# Patient Record
Sex: MALE | Race: WHITE | NOT HISPANIC OR LATINO | URBAN - METROPOLITAN AREA
[De-identification: names, ages, dates, MRNs, and addresses within clinical notes are randomized per-mention and may not be internally consistent; named-entity substitution may affect disease eponyms.]

---

## 2018-12-29 PROBLEM — J06.9 URI WITH COUGH AND CONGESTION: Status: ACTIVE | Noted: 2018-12-29

## 2020-10-09 ENCOUNTER — TELEPHONE (OUTPATIENT)
Dept: ENDOCRINOLOGY | Facility: CLINIC | Age: 50
End: 2020-10-09

## 2020-10-09 DIAGNOSIS — E03.9 PRIMARY HYPOTHYROIDISM: Primary | ICD-10-CM

## 2020-10-09 NOTE — TELEPHONE ENCOUNTER
PATIENT IS CALLING STATING HIS WRIST BAND TELLS HIM DURING HIS SLEEP HIS RESTING HEART RATE IS 38-39 PER MINUTES. THINGS THAT CAN CAUSE LOW HEART RATE IS HYPOTHYROIDISM FROM READING THE INTERNET. HE WOULD LIKE A CALL BACK TO DISCUSS THIS AND SEE IF THERE IS SOMETHING HE NEEDS TO BE DOING OR USING TO HELP RESTING HEART RATE. PHONE NUMBER [phone]. HE IS HAVING SENSORY ISSUES AS WELL WITH TINGLING OF EXTREMITIES AND SOME DIZZY SPELLS. HE DOES HAVE HYPOTHYROID ISSUES.

## 2020-10-09 NOTE — TELEPHONE ENCOUNTER
If the hypothyroidism isn't treated it can cause low heart rate.  It has been almost a year since we saw him.  If he hasn't had TSH done recently, let's have him get one done.

## 2020-12-03 ENCOUNTER — OFFICE VISIT (OUTPATIENT)
Dept: ENDOCRINOLOGY | Facility: CLINIC | Age: 50
End: 2020-12-03

## 2020-12-03 ENCOUNTER — LAB (OUTPATIENT)
Dept: LAB | Facility: HOSPITAL | Age: 50
End: 2020-12-03

## 2020-12-03 VITALS
HEART RATE: 62 BPM | TEMPERATURE: 97.1 F | WEIGHT: 227.6 LBS | DIASTOLIC BLOOD PRESSURE: 76 MMHG | HEIGHT: 76 IN | OXYGEN SATURATION: 99 % | BODY MASS INDEX: 27.72 KG/M2 | SYSTOLIC BLOOD PRESSURE: 118 MMHG

## 2020-12-03 DIAGNOSIS — R00.1 BRADYCARDIA: ICD-10-CM

## 2020-12-03 DIAGNOSIS — E55.9 VITAMIN D DEFICIENCY: ICD-10-CM

## 2020-12-03 DIAGNOSIS — E03.9 HYPOTHYROIDISM (ACQUIRED): Primary | ICD-10-CM

## 2020-12-03 DIAGNOSIS — E03.9 HYPOTHYROIDISM (ACQUIRED): ICD-10-CM

## 2020-12-03 LAB
25(OH)D3 SERPL-MCNC: 45.8 NG/ML (ref 30–100)
TSH SERPL DL<=0.05 MIU/L-ACNC: 1.54 UIU/ML (ref 0.27–4.2)

## 2020-12-03 PROCEDURE — 99214 OFFICE O/P EST MOD 30 MIN: CPT | Performed by: INTERNAL MEDICINE

## 2020-12-03 PROCEDURE — 84443 ASSAY THYROID STIM HORMONE: CPT

## 2020-12-03 PROCEDURE — 82306 VITAMIN D 25 HYDROXY: CPT

## 2020-12-03 RX ORDER — LANOLIN ALCOHOL/MO/W.PET/CERES
1000 CREAM (GRAM) TOPICAL DAILY
COMMUNITY

## 2020-12-03 NOTE — PROGRESS NOTES
"     Office Note      Date: 2020  Patient Name: Dada Hooks  MRN: [id number]  : 1970    Chief Complaint   Patient presents with   • Follow-up   • Thyroid Problem       History of Present Illness:   Dada Hooks is a 50 y.o. male who presents for Follow-up and Thyroid Problem    He remains on synthroid 75mcg qd. He is taking this correctly. He isn't taking any  interfering meds concurrently. He denies any sxs of hypo- or hyperthyroidism. He hasn't  noted any changes in his neck. He denies compressive sxs.    He has been wearing an Apple watch.  It is alerting him that his pulse is in the 30's at night.  He notes some dyspnea.    He is taking vitamin D 2000 IU qd.    Subjective      Review of Systems:   Review of Systems   Constitutional: Negative.    Cardiovascular: Positive for chest pain.   Gastrointestinal: Negative.    Endocrine: Negative.    All other systems reviewed and are negative.      The following portions of the patient's history were reviewed and updated as appropriate: allergies, current medications, past family history, past medical history, past social history, past surgical history and problem list.    Objective     Visit Vitals  /76   Pulse 62   Temp 97.1 °F (36.2 °C) (Infrared)   Ht 193 cm (76\")   Wt 103 kg (227 lb 9.6 oz)   SpO2 99%   BMI 27.70 kg/m²       Physical Exam:  Physical Exam  Constitutional:       Appearance: Normal appearance.   Neurological:      Mental Status: He is alert.         Labs:    TSH  No results found for: TSHBASE     Free T4  No results found for: FREET4    T3  No results found for: K7JORZX      TPO  No results found for: THYROIDAB    TG AB  No results found for: THGAB    TG  No results found for: THYROGLB    CBC w/DIFF  No results found for: WBC, RBC, HGB, HCT, MCV, MCH, MCHC, RDW, RDWSD, MPV, PLT, NEUTRORELPCT, LYMPHORELPCT, MONORELPCT, EOSRELPCT, BASORELPCT, AUTOIGPER, NEUTROABS, LYMPHSABS, MONOSABS, EOSABS, BASOSABS, AUTOIGNUM, " [facility]        Assessment / Plan      Assessment & Plan:  Problem List Items Addressed This Visit        Cardiovascular and Mediastinum    Bradycardia    Current Assessment & Plan     Will make cardiology referral.         Relevant Orders    Ambulatory Referral to Cardiology       Digestive    Vitamin D deficiency    Current Assessment & Plan     Check vit D today.         Relevant Orders    Vitamin D 25 Hydroxy       Endocrine    Hypothyroidism (acquired) - Primary    Current Assessment & Plan     Check TSH today.         Relevant Orders    TSH           Return in about 1 year (around 12/3/2021) for Recheck with TSH, 25 OH vit D.    Watson Cárdenas MD   12/03/2020

## 2020-12-16 NOTE — PROGRESS NOTES
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 Bradycardia   2. Murmur  3. Hypothyroidism   4. Surgical Hx:  1. Foot surgery      CC:  Chief Complaint   Patient presents with   • Slow Heart Rate       Allergies  Allergies   Allergen Reactions   • Penicillins Anaphylaxis       Current Medications    Current Outpatient Medications:   •  brompheniramine-pseudoephedrine-DM 30-2-10 MG/5ML syrup, Take 5 mL by mouth 4 (Four) Times a Day As Needed for Congestion or Cough., Disp: 240 mL, Rfl: 0  •  cetirizine (zyrTEC) 10 MG tablet, Take 10 mg by mouth Daily., Disp: , Rfl:   •  Cholecalciferol (vitamin D3) 125 MCG (5000 UT) capsule capsule, Take 5,000 Units by mouth Daily., Disp: , Rfl:   •  levothyroxine (SYNTHROID, LEVOTHROID) 75 MCG tablet, Take 75 mcg by mouth Daily., Disp: , Rfl:   •  vitamin B-12 (CYANOCOBALAMIN) 1000 MCG tablet, Take 1,000 mcg by mouth Daily., Disp: , Rfl:      History of Present Illness   HPI  Dada Hooks is a 50 y.o. year old male with the above mentioned PMH who presents for consult from Watson Cárdenas MD for evaluation of bradycardia.     He states he recently got an apple watch, and found that his HR is usually high 30s overnight, sometimes awake at rest. Reports HR has \"always\" had a low HR, usually in 40s. He reports some dizziness with abrupt position changes. He has noted some chest discomfort randomly, not exertional. 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 Occasional chest tightness.     Has been told in the past he had a heart murmur.     Pt denies any exertional chest pain, dyspnea at rest,  orthopnea, PND, palpitations, lower extremity edema, or claudication.     Pt denies history of CHF, " "DVT, PE, MI, CVA, TIA, or rheumatic fever. Follows w Dr. Cárdenas for hypothyroidism.  Maternal grandparents with heart disease, multiple diabetic family members.       ROS  Review of Systems   Cardiovascular: Positive for dyspnea on exertion.   Neurological: Positive for dizziness.   All other systems reviewed and are negative.      SOCIAL HX  Social History     Socioeconomic History   • Marital status: Single     Spouse name: Not on file   • Number of children: Not on file   • Years of education: Not on file   • Highest education level: Not on file   Tobacco Use   • Smoking status: Never Smoker   • Smokeless tobacco: Never Used   Substance and Sexual Activity   • Alcohol use: Yes     Comment: seldom   • Drug use: No   • Sexual activity: Defer       FAMILY HX  Family History   Problem Relation Age of Onset   • Diabetes Mother    • Heart attack Mother    • Diabetes Maternal Grandmother    • Diabetes Maternal Grandfather    • No Known Problems Father        Vitals:    12/18/20 1254   BP: 110/74   BP Location: Left arm   Patient Position: Sitting   Cuff Size: Adult   Pulse: 58   SpO2: 98%   Weight: 102 kg (225 lb)   Height: 193 cm (76\")       PHYSICAL EXAMINATION:  Vitals signs and nursing note reviewed.   Constitutional:       General: Not in acute distress.     Appearance: Well-developed and not in distress.   Eyes:      Conjunctiva/sclera: Conjunctivae normal.   HENT:      Head: Normocephalic and atraumatic.      Right Ear: External ear normal.      Left Ear: External ear normal.      Nose: Nose normal.   Neck:      Musculoskeletal: Neck supple.      Thyroid: No thyromegaly.      Vascular: No carotid bruit or JVD.   Pulmonary:      Effort: Pulmonary effort is normal. No respiratory distress.      Breath sounds: Normal breath sounds. No decreased breath sounds. No wheezing. No rhonchi. No rales.   Cardiovascular:      Bradycardia present. Regular rhythm. Normal S1. Normal S2.      Murmurs: There is a systolic murmur. "      No gallop. No click. No rub.   Pulses:     No decreased pulses.      Posterior tibial: 2+ bilaterally.  Edema:     Peripheral edema absent.   Abdominal:      General: Bowel sounds are normal.      Palpations: Abdomen is soft.      Tenderness: There is no abdominal tenderness.   Musculoskeletal: Normal range of motion.   Skin:     General: Skin is warm and dry.      Findings: No erythema.   Neurological:      Mental Status: Alert and oriented to person, place, and time.      Comments: No focal deficits.   Psychiatric:         Thought Content: Thought content normal.         Diagnostic Data:    ECG 12 Lead    Date/Time: 12/18/2020 1:11 PM  Performed by: Dada Guerrero MD  Authorized by: Dada Guerrero MD   Comparison: not compared with previous ECG   Previous ECG: no previous ECG available  Rhythm: sinus bradycardia  BPM: 58    Clinical impression: abnormal EKG and non-specific ECG          No results found for: CHLPL, TRIG, HDL, LDLDIRECT  No results found for: GLUCOSE, BUN, CREATININE, NA, K, CL, CO2, CREATININE, BUN  No results found for: HGBA1C  No results found for: WBC, HGB, HCT, PLT    ASSESSMENT:   Diagnosis Plan   1. Bradycardia, sinus  ECG 12 Lead    Holter Monitor - 72 Hour Up To 21 Days   2. Dizziness  Holter Monitor - 72 Hour Up To 21 Days   3. Heart murmur         PLAN:  1. Will have the patient wear a heart monitor over the next week to further evaluate. In light of murmur and no prior eval will also have him get an echo to evaluate the structure and function of his heart  2. Discussed getting regular exercise and general risk factor modification       Watson Cárdenas MD, thank you for referring Mr. Hooks for evaluation.  I have forwarded my electronically generated recommendations to you for review.  Please do not hesitate to call with any questions.    Scribed for Dada Guerrero MD by Kim Dang PA-C. 12/18/2020  14:26 EST   Dada Guerrero MD, [facility]

## 2020-12-18 ENCOUNTER — CONSULT (OUTPATIENT)
Dept: CARDIOLOGY | Facility: CLINIC | Age: 50
End: 2020-12-18

## 2020-12-18 VITALS
WEIGHT: 225 LBS | OXYGEN SATURATION: 98 % | BODY MASS INDEX: 27.4 KG/M2 | SYSTOLIC BLOOD PRESSURE: 110 MMHG | DIASTOLIC BLOOD PRESSURE: 74 MMHG | HEART RATE: 58 BPM | HEIGHT: 76 IN

## 2020-12-18 DIAGNOSIS — R42 DIZZINESS: ICD-10-CM

## 2020-12-18 DIAGNOSIS — R00.1 BRADYCARDIA, SINUS: Primary | ICD-10-CM

## 2020-12-18 DIAGNOSIS — R01.1 HEART MURMUR: ICD-10-CM

## 2020-12-18 PROCEDURE — 99243 OFF/OP CNSLTJ NEW/EST LOW 30: CPT | Performed by: INTERNAL MEDICINE

## 2020-12-18 PROCEDURE — 93000 ELECTROCARDIOGRAM COMPLETE: CPT | Performed by: INTERNAL MEDICINE

## 2020-12-18 RX ORDER — LEVOTHYROXINE SODIUM 0.07 MG/1
75 TABLET ORAL DAILY
COMMUNITY
End: 2021-04-13 | Stop reason: SDUPTHER

## 2021-01-08 ENCOUNTER — TELEPHONE (OUTPATIENT)
Dept: CARDIOLOGY | Facility: CLINIC | Age: 51
End: 2021-01-08

## 2021-01-08 NOTE — TELEPHONE ENCOUNTER
Spoke with patient and advised per Yolanda Ambriz most recent holter exam was normal. Pt verbalized understanding

## 2021-04-13 RX ORDER — LEVOTHYROXINE SODIUM 0.07 MG/1
75 TABLET ORAL DAILY
Qty: 90 TABLET | Refills: 0 | Status: SHIPPED | OUTPATIENT
Start: 2021-04-13 | End: 2021-04-13 | Stop reason: SDUPTHER

## 2021-04-13 RX ORDER — LEVOTHYROXINE SODIUM 0.07 MG/1
75 TABLET ORAL DAILY
Qty: 90 TABLET | Refills: 2 | Status: SHIPPED | OUTPATIENT
Start: 2021-04-13 | End: 2021-07-09

## 2021-07-09 RX ORDER — LEVOTHYROXINE SODIUM 75 MCG
TABLET ORAL
Qty: 90 TABLET | Refills: 1 | Status: SHIPPED | OUTPATIENT
Start: 2021-07-09 | End: 2021-12-02 | Stop reason: SDUPTHER

## 2021-11-17 ENCOUNTER — TREATMENT (OUTPATIENT)
Dept: PHYSICAL THERAPY | Facility: CLINIC | Age: 51
End: 2021-11-17

## 2021-11-17 DIAGNOSIS — M54.12 RADICULOPATHY, CERVICAL: Primary | ICD-10-CM

## 2021-11-17 PROCEDURE — 97161 PT EVAL LOW COMPLEX 20 MIN: CPT | Performed by: PHYSICAL THERAPIST

## 2021-11-17 NOTE — PROGRESS NOTES
Physical Therapy Initial Evaluation and Plan of Care      Patient: Dada Hooks   : 1970  Diagnosis/ICD-10 Code:  Radiculopathy, cervical [M54.12]  Referring practitioner: Self Referring  Date of Initial Visit: 2021  Today's Date: 2021  Patient seen for 1 sessions           Subjective Questionnaire: Oswestry: =12% impairment    Subjective Evaluation    History of Present Illness  Mechanism of injury: For the past 3 weeks has experienced pn in the L side of his neck, radiating into his L arm, insidious in onset. Tightness w/ turning his head to the L, pn w/ sleeping on his L side, raising his arm. Associated w/ tingling in his fingers, digits 1-4. Pn is mild but is constant. Cannot take NSAIDs. No alleviating factors.    Subjective comment: Neck and LUE pn, paresthesia  Patient Occupation: self employed-owns retail store, manages rental properties Quality of life: good    Pain  Current pain ratin  At best pain ratin  At worst pain ratin  Quality: radiating  Relieving factors: rest and change in position  Aggravating factors: repetitive movement, prolonged positioning and sleeping  Progression: no change    Hand dominance: right    Diagnostic Tests  X-ray: abnormal (degenerative changes)    Treatments  No previous or current treatments  Patient Goals  Patient goals for therapy: decreased pain, increased motion, independence with ADLs/IADLs and increased strength           Treatment  Exercise 1  Exercise Name 1: doorway pec stretch-90 deg abd  Sets/Reps 1: 3  Time: 15 sec  Exercise 2  Exercise Name 2: self cervical traction  Sets/Reps 2: 3  Time 2: 10 sec  Exercise 3  Exercise Name 3: standing B shoulder extn w/ cane  Sets/Reps 3: 15    Manual Rx 1  Manual Rx 1 Location: supine manual cervical distraction- w/ R SB/rotation     Objective          Postural Observations  Seated posture: poor        Tenderness     Additional Tenderness Details  Mild TTP L lower cervical  facets    Neurological Testing     Sensation   Cervical/Thoracic   Left   Intact: light touch    Right   Intact: light touch    Reflexes   Left   Biceps (C5/C6): trace (1+)  Brachioradialis (C6): trace (1+)    Right   Biceps (C5/C6): trace (1+)  Brachioradialis (C6): trace (1+)    Additional Neurological Details  Reports tingling w/ touch over digits 1,2, and radial half of 3rd digit    Active Range of Motion   Cervical/Thoracic Spine   Cervical    Flexion: 58 degrees   Extension: 52 degrees   Left lateral flexion: 20 degrees with pain  Right lateral flexion: 50 degrees   Left rotation: 30 degrees with pain  Right rotation: 68 degrees     Additional Active Range of Motion Details  Shoulder mobility intact, notes pn top of shoulder w/ elevation  L lower cervical pn w/ L rotation, SB      Strength/Myotome Testing     Left Wrist/Hand      (2nd hand position)    Trial 1: 90 lbs    Trial 2: 97 lbs    Trial 3: 87 lbs    Average: 91.33 lbs    Right Wrist/Hand      (2nd hand position)     Trial 1: 110 lbs    Trial 2: 114 lbs    Trial 3: 113 lbs    Average: 112.33 lbs    Tests   Cervical     Left   Positive Spurling's sign.   Negative ULTT1, ULTT3 and ULTT4.     Additional Tests Details  (+) cervical compression/distraction          Assessment & Plan     Assessment  Impairments: abnormal or restricted ROM, activity intolerance, impaired physical strength, lacks appropriate home exercise program and pain with function  Assessment details: Pt is a 51 YOM who presents to PT w/ complaint of acute onset L cervical and UE pn, tingling in fingers. Findings consistent w/ cervical radiculopathy. He demonstrates painful and limited L cervical rotation and SB. (+) cervical compression, distraction, Spurlings tests. ULTT (-). He is TTP over the L lower cervical facets. Demo poor postural awareness w/ significant rounding of the shoulders, forward head alignment. Motor function intact. Reflexes diminished but symmetrical to  uninvolved side. Reports tingling along C6/7 dermatomes, pn primarily along C6 distribution. He reported near full resolution of tingling w/ manual traction, partial centralization of pn. Pt's symptoms and deficits limit his tolerance to daily and work activities. He would benefit from skilled PT to address his deficits and allow return to PLOF.   Barriers to therapy: n/a  Prognosis: good  Functional Limitations: lifting, sleeping, uncomfortable because of pain, reaching behind back, reaching overhead and unable to perform repetitive tasks  Goals  Plan Goals: STG 3 wks  1) Pt to be compliant w/ initial HEP for ROM, strength and symptom mgmt.  2) Pt to report pn at rest to be no greater than 2/10.  3) Pt to improve cervical ROM to 45 deg L rotation, 30 deg L SB.    LTG 6 wks  1) Pt to be independent w/ long term HEP and self mgmt.  2) Pt to report no pn at rest.  3) Pt to improve cervical ROM to 60 deg L rotation, 45 deg L SB.  4) Pt to report resolution of UE paresthesia.  5) Pt to report no sleep disturbance related to his symptoms.    Plan  Therapy options: will be seen for skilled physical therapy services  Planned modality interventions: cryotherapy, thermotherapy (hydrocollator packs), TENS, traction, ultrasound and dry needling  Planned therapy interventions: flexibility, functional ROM exercises, home exercise program, manual therapy, joint mobilization, neuromuscular re-education, postural training, soft tissue mobilization, spinal/joint mobilization, strengthening, stretching and therapeutic activities  Frequency: 2x week  Duration in weeks: 12  Treatment plan discussed with: patient  Plan details: PT POC to include progressive cervical/scapular strengthening, stretching program, neurodynamics, manual therapy techniques, modalities as indicated for pn control        Timed:  Manual Therapy:    0     mins  80967;  Therapeutic Exercise:    0     mins  10008;     Neuromuscular Alla:    0    mins  68186;     Therapeutic Activity:     0     mins  02445;     Gait Trainin     mins  36715;     Ultrasound:     0     mins  74304;    Electrical Stimulation:    0     mins  04749 ( );    Untimed:  Electrical Stimulation:    0     mins  51922 ( );  Mechanical Traction:    0     mins  23709;     Timed Treatment:   0   mins   Total Treatment:     50   mins    PT SIGNATURE: Radha Hale, PT   DATE TREATMENT INITIATED: 2021    Initial Certification  Certification Period: 2/15/2022  I certify that the therapy services are furnished while this patient is under my care.  The services outlined above are required by this patient, and will be reviewed every 90 days.     PHYSICIAN: Referring, Self      DATE:     Please sign and return via fax to [phone]. Thank you, [facility].

## 2021-11-24 ENCOUNTER — TREATMENT (OUTPATIENT)
Dept: PHYSICAL THERAPY | Facility: CLINIC | Age: 51
End: 2021-11-24

## 2021-11-24 DIAGNOSIS — M54.12 RADICULOPATHY, CERVICAL: Primary | ICD-10-CM

## 2021-11-24 PROCEDURE — 97110 THERAPEUTIC EXERCISES: CPT | Performed by: PHYSICAL THERAPIST

## 2021-11-24 PROCEDURE — 97140 MANUAL THERAPY 1/> REGIONS: CPT | Performed by: PHYSICAL THERAPIST

## 2021-11-24 PROCEDURE — 97530 THERAPEUTIC ACTIVITIES: CPT | Performed by: PHYSICAL THERAPIST

## 2021-11-24 PROCEDURE — 97112 NEUROMUSCULAR REEDUCATION: CPT | Performed by: PHYSICAL THERAPIST

## 2021-11-24 NOTE — PATIENT INSTRUCTIONS
Access Code: [email or web address]  URL: [email or web address]  Date: 11/24/2021  Prepared by: Radha Hale    Exercises  Doorway Pec Stretch at 90 Degrees Abduction - 2 x daily - 3 reps - 15 sec hold  Standing Shoulder Extension with Dowel - 2 x daily - 10-15 reps  Seated Neck and Traction - 2 x daily - 5 reps - 10 sec hold  Median Nerve Flossing - 2 x daily - 1-2 sets - 15 reps  Standing Wrist Flexor Stretch with Arm Bent - 2 x daily - 3 reps - 15 sec hold  Seated Wrist Extension with Overpressure - 2 x daily - 3 reps - 15 sec hold  Seated Cervical Retraction - 2 x daily - 15-20 reps

## 2021-11-24 NOTE — PROGRESS NOTES
Physical Therapy Daily Treatment Note      Patient: Dada Hooks   : 1970  Referring practitioner: Self Referring  Date of Initial Visit: Type: THERAPY  Noted: 2021  Today's Date: 2021  Patient seen for 2 sessions       Visit Diagnoses:    ICD-10-CM ICD-9-CM   1. Radiculopathy, cervical  M54.12 723.4       Subjective   Dada Hooks reports: Pt states that pn is much improved. Seems to be mostly gone. Tingling still comes and goes, isolated to first 3 digits. Occurs primarily when he is gripping/pinching objects.    Pre tx pn score: 0  Post tx pn score: 0      Objective   See Exercise, Manual, and Modality Logs for complete treatment.     Active Range of Motion   Cervical/Thoracic Spine   Cervical     Flexion: 58 degrees   Extension: 52 degrees   Left lateral flexion: 28 degrees with mod to severe L cervical pn  Right lateral flexion: 50 degrees   Left rotation: 50 degrees with slight discomfort  Right rotation: 68 degrees       Assessment & Plan     Assessment    Assessment details: Pn significantly improved, virtually resolved per pt report. Demo improved cervical ROM, still has discomfort w/ L SB>L rotation. Continued manual traction and cervical mobilization techniques w/ good response. Initiated nerve gliding techniques and postural strengthening. Educated on relationship between poor posture and abnormal nerve/muscle tension, optimal sleeping postures, emphasizing neutral head/neck alignment. Updated HEP to include median nerve glides, cervical retraction.    Plan  Plan details: Cont w/ current POC          Timed:         Manual Therapy:    15     mins  23599;     Therapeutic Exercise:    12     mins  96859;     Neuromuscular Alla:    10    mins  10748;    Therapeutic Activity:     13     mins  70384;     Gait Trainin     mins  03444;     Ultrasound:     0     mins  60800;    Ionto                               0    mins   10488  Self Care                       0     mins    59392  Canalith Repos    0     mins 34456      Un-Timed:  Electrical Stimulation:    0     mins  75962 ( );  Dry Needling     0     mins self-pay  Traction     0     mins 03811      Timed Treatment:   50   mins   Total Treatment:     50   mins    Radha Hale, PT  [place] License: #[id number]

## 2021-11-30 ENCOUNTER — TREATMENT (OUTPATIENT)
Dept: PHYSICAL THERAPY | Facility: CLINIC | Age: 51
End: 2021-11-30

## 2021-11-30 DIAGNOSIS — M54.12 RADICULOPATHY, CERVICAL: Primary | ICD-10-CM

## 2021-11-30 PROCEDURE — 97112 NEUROMUSCULAR REEDUCATION: CPT | Performed by: PHYSICAL THERAPIST

## 2021-11-30 PROCEDURE — 97530 THERAPEUTIC ACTIVITIES: CPT | Performed by: PHYSICAL THERAPIST

## 2021-11-30 PROCEDURE — 97110 THERAPEUTIC EXERCISES: CPT | Performed by: PHYSICAL THERAPIST

## 2021-11-30 NOTE — PROGRESS NOTES
"Physical Therapy Daily Treatment Note      Patient: Dada Hooks   : 1970  Referring practitioner: Self Referring  Date of Initial Visit: Type: THERAPY  Noted: 2021  Today's Date: 2021  Patient seen for 3 sessions       Visit Diagnoses:    ICD-10-CM ICD-9-CM   1. Radiculopathy, cervical  M54.12 723.4       Subjective   Dada Hooks reports: \"I'm forgetting about the pn, which is good. I only feel it in the back of my [L] shoulder. Tingling is worst when I'm taking a shower. Otherwise I only notice it when I'm touching my fingers. I haven't been as good with my exercises lately, I didn't really work on the new ones.\"    Pre tx pn score: 2  Post tx pn score: 0      Objective   See Exercise, Manual, and Modality Logs for complete treatment.       Assessment & Plan     Assessment    Assessment details: Notes constant low grade discomfort on top of L shoulder blade, diminished immediately w/ cues for improved posture. Does not notice pn in neck w/ daily activities. UE paresthesia most noticeable when external stimulus applied (water from shower, touch, etc). Focused today's visit on improving interscapular strength, pec stretching, and neurodynamics. Notes discomfort/tension in arm w/ low level nerve glides.  Fatigues quickly w/ most strengthening exercise and requires frequent rest breaks. Overall doing much better and would benefit from cont PT to optimize posture and reduce neural tension.    Plan  Plan details: Progress current POC as pt tolerates          Timed:         Manual Therapy:    0     mins  89261;     Therapeutic Exercise:    10     mins  84790;     Neuromuscular Alla:    8    mins  32462;    Therapeutic Activity:     12     mins  26570;     Gait Trainin     mins  73393;     Ultrasound:     0     mins  79431;    Ionto                               0    mins   45343  Self Care                       0     mins   17257  Canalith Repos    0     mins " 74022      Un-Timed:  Electrical Stimulation:    0     mins  30816 ( );  Dry Needling     0     mins self-pay  Traction     0     mins 32837      Timed Treatment:   30   mins   Total Treatment:     30   mins    Radha Hale, PT  [place] License: #[id number]

## 2021-12-02 ENCOUNTER — OFFICE VISIT (OUTPATIENT)
Dept: ENDOCRINOLOGY | Facility: CLINIC | Age: 51
End: 2021-12-02

## 2021-12-02 ENCOUNTER — LAB (OUTPATIENT)
Dept: LAB | Facility: HOSPITAL | Age: 51
End: 2021-12-02

## 2021-12-02 VITALS
DIASTOLIC BLOOD PRESSURE: 76 MMHG | HEART RATE: 64 BPM | OXYGEN SATURATION: 97 % | SYSTOLIC BLOOD PRESSURE: 124 MMHG | BODY MASS INDEX: 28.49 KG/M2 | WEIGHT: 234 LBS | HEIGHT: 76 IN

## 2021-12-02 DIAGNOSIS — E03.9 HYPOTHYROIDISM (ACQUIRED): ICD-10-CM

## 2021-12-02 DIAGNOSIS — E55.9 VITAMIN D DEFICIENCY: ICD-10-CM

## 2021-12-02 DIAGNOSIS — E03.9 HYPOTHYROIDISM (ACQUIRED): Primary | ICD-10-CM

## 2021-12-02 LAB
25(OH)D3 SERPL-MCNC: 46.3 NG/ML
TSH SERPL DL<=0.05 MIU/L-ACNC: 1.41 UIU/ML (ref 0.27–4.2)

## 2021-12-02 PROCEDURE — 99213 OFFICE O/P EST LOW 20 MIN: CPT | Performed by: INTERNAL MEDICINE

## 2021-12-02 PROCEDURE — 82306 VITAMIN D 25 HYDROXY: CPT

## 2021-12-02 PROCEDURE — 84443 ASSAY THYROID STIM HORMONE: CPT

## 2021-12-02 RX ORDER — LEVOTHYROXINE SODIUM 0.07 MG/1
75 TABLET ORAL DAILY
Qty: 30 TABLET | Refills: 5 | Status: SHIPPED | OUTPATIENT
Start: 2021-12-02 | End: 2021-12-02 | Stop reason: SDUPTHER

## 2021-12-02 RX ORDER — ACETAMINOPHEN 160 MG
2000 TABLET,DISINTEGRATING ORAL DAILY
Start: 2021-12-02 | End: 2022-12-02

## 2021-12-02 RX ORDER — LEVOTHYROXINE SODIUM 0.07 MG/1
75 TABLET ORAL DAILY
Qty: 90 TABLET | Refills: 3 | Status: SHIPPED | OUTPATIENT
Start: 2021-12-02 | End: 2022-12-02 | Stop reason: SDUPTHER

## 2021-12-02 NOTE — PROGRESS NOTES
"     Office Note      Date: 2021  Patient Name: Dada Hooks  MRN: [id number]  : 1970    Chief Complaint   Patient presents with   • Hypothyroidism       History of Present Illness:   Dada Hooks is a 51 y.o. male who presents for Hypothyroidism    He remains on synthroid 75mcg qd. He is taking this correctly. He isn't taking any interfering meds concurrently. He denies any sxs of hypo- or hyperthyroidism. He hasn't noted any changes in his neck. He denies compressive sxs.     At the last visit we made cardiology referral due to bradycardia.  Workup was unrevealing.  He notes occ pulse in the mid 40s on his AppleWatch.  He notes he is using more caffeine to keep going.  He has been cleared to exercise and has been able to walk on the treadmill.  He notes some dyspnea with bending over.  No BERNAL.     He is taking vitamin D 2000 IU qd.    Subjective      Review of Systems:   Review of Systems   Constitutional: Negative.    Cardiovascular: Negative.    Gastrointestinal: Negative.    Endocrine: Negative.        The following portions of the patient's history were reviewed and updated as appropriate: allergies, current medications, past family history, past medical history, past social history, past surgical history and problem list.    Objective     Visit Vitals  /76   Pulse 64   Ht 193 cm (76\")   Wt 106 kg (234 lb)   SpO2 97%   BMI 28.48 kg/m²       Physical Exam:  Physical Exam  Constitutional:       Appearance: Normal appearance.   Neck:      Thyroid: No thyroid mass, thyromegaly or thyroid tenderness.   Lymphadenopathy:      Cervical: No cervical adenopathy.   Neurological:      Mental Status: He is alert.         Labs:    TSH  No results found for: TSHBASE     Free T4  No results found for: FREET4    T3  No results found for: N2IBUQH      TPO  No results found for: THYROIDAB    TG AB  No results found for: THGAB    TG  No results found for: THYROGLB    CBC w/DIFF  No results found for: WBC, " RBC, HGB, HCT, MCV, MCH, MCHC, RDW, RDWSD, MPV, PLT, NEUTRORELPCT, LYMPHORELPCT, MONORELPCT, EOSRELPCT, BASORELPCT, AUTOIGPER, NEUTROABS, LYMPHSABS, MONOSABS, EOSABS, BASOSABS, AUTOIGNUM, NRBC        Assessment / Plan      Assessment & Plan:  Diagnoses and all orders for this visit:    1. Hypothyroidism (acquired) (Primary)  Assessment & Plan:  Continue T4.  Check TSH today.    Orders:  -     TSH; Future    2. Vitamin D deficiency  Assessment & Plan:  Continue vit D supplement.  Check vit D levels today.    Orders:  -     Vitamin D 25 Hydroxy; Future    Other orders  -     Cholecalciferol (Vitamin D3) 50 MCG (2000 UT) capsule; Take 1 capsule by mouth Daily.  -     Discontinue: levothyroxine (Synthroid) 75 MCG tablet; Take 1 tablet by mouth Daily.  Dispense: 30 tablet; Refill: 5  -     levothyroxine (Synthroid) 75 MCG tablet; Take 1 tablet by mouth Daily.  Dispense: 90 tablet; Refill: 3      Return in about 1 year (around 12/2/2022) for Recheck with TSH, 25 OH vit D, CMP.    Watson Cárdenas MD   12/02/2021

## 2021-12-03 ENCOUNTER — TREATMENT (OUTPATIENT)
Dept: PHYSICAL THERAPY | Facility: CLINIC | Age: 51
End: 2021-12-03

## 2021-12-03 DIAGNOSIS — M54.12 RADICULOPATHY, CERVICAL: Primary | ICD-10-CM

## 2021-12-03 PROCEDURE — 97110 THERAPEUTIC EXERCISES: CPT | Performed by: PHYSICAL THERAPIST

## 2021-12-03 PROCEDURE — 97140 MANUAL THERAPY 1/> REGIONS: CPT | Performed by: PHYSICAL THERAPIST

## 2021-12-03 PROCEDURE — 97112 NEUROMUSCULAR REEDUCATION: CPT | Performed by: PHYSICAL THERAPIST

## 2021-12-03 NOTE — PROGRESS NOTES
Physical Therapy Daily Treatment Note      Patient: Dada Hooks   : 1970  Referring practitioner: Self Referring  Date of Initial Visit: Type: THERAPY  Noted: 2021  Today's Date: 12/3/2021  Patient seen for 4 sessions       Visit Diagnoses:    ICD-10-CM ICD-9-CM   1. Radiculopathy, cervical  M54.12 723.4       Subjective   Dada Hooks reports: L arm symptoms significantly improved, to the point the tingling is almost nonexistent. Unsure if related to his current issues but has been experiencing pn/cramping in the top and medial border of his R shoulder blade when laying on his back reading, even w/ head supported.     Pre tx pn score: 2  Post tx pn score: 2      Objective   See Exercise, Manual, and Modality Logs for complete treatment.       Assessment & Plan     Assessment    Assessment details: LUE paresthesia improving, neck pn minimal. Actually reported R sided lower cervical/scapular discomfort w/ rotation indicative of R lower facet dysfunction. Improved significantly w/ MWM techniques. Instructed in self mobilization for use at home. No reported tension w/ nerve glides, only reported fatigue in back of forearm. Continued w/ progression of postural strengthening as detailed in chart. Tolerated well. Encouraged taking breaks from prolonged computer use to stretch, change positions given his complaints of pn w/ computer work.     Plan  Plan details: Cont w/ POC          Timed:         Manual Therapy:    8     mins  92896;     Therapeutic Exercise:    24     mins  16741;     Neuromuscular Alla:    9    mins  80683;    Therapeutic Activity:     0     mins  43487;     Gait Trainin     mins  83305;     Ultrasound:     0     mins  62748;    Ionto                               0    mins   08176  Self Care                       0     mins   48770  Canalith Repos    0     mins 16522      Un-Timed:  Electrical Stimulation:    0     mins  35211 ( );  Dry Needling     0     mins  self-pay  Traction     0     mins 78381      Timed Treatment:   41   mins   Total Treatment:     41   mins    Radha Hale, PT  [place] License: #[id number]

## 2021-12-07 ENCOUNTER — TREATMENT (OUTPATIENT)
Dept: PHYSICAL THERAPY | Facility: CLINIC | Age: 51
End: 2021-12-07

## 2021-12-07 DIAGNOSIS — M54.12 RADICULOPATHY, CERVICAL: Primary | ICD-10-CM

## 2021-12-07 PROCEDURE — 97110 THERAPEUTIC EXERCISES: CPT | Performed by: PHYSICAL THERAPIST

## 2021-12-07 PROCEDURE — 97140 MANUAL THERAPY 1/> REGIONS: CPT | Performed by: PHYSICAL THERAPIST

## 2021-12-07 PROCEDURE — 97112 NEUROMUSCULAR REEDUCATION: CPT | Performed by: PHYSICAL THERAPIST

## 2021-12-07 PROCEDURE — 97530 THERAPEUTIC ACTIVITIES: CPT | Performed by: PHYSICAL THERAPIST

## 2021-12-07 NOTE — PROGRESS NOTES
Physical Therapy Daily Treatment Note      Patient: Dada Hooks   : 1970  Referring practitioner: Self Referring  Date of Initial Visit: Type: THERAPY  Noted: 2021  Today's Date: 2021  Patient seen for 5 sessions       Visit Diagnoses:    ICD-10-CM ICD-9-CM   1. Radiculopathy, cervical  M54.12 723.4       Subjective   Dada Hooks reports:R shoulder blade pn diminished significantly after last visit but returned while laying on his back and reading. Tingling in L UE almost gone. L shoulder pn may be slightly better, minimal.    Pre tx pn score: 2  Post tx pn score: 0      Objective   See Exercise, Manual, and Modality Logs for complete treatment.     Dec suboccipital length, non TTP  L UT/LS tightness, none on R  Min pn w/ cervical rotation/SB  R mid cervical discomfort w/ active cervical retraction in sitting          Assessment & Plan     Assessment    Assessment details: UE paresthesia continues to diminish, nearly resolved. L shoulder pn minimal. Pn along superior scapular angle, medial scapular border on R most prominent. All symptoms improved w/ cues for upright posture, including scapular and cervical retraction. Cont w/ postural strengthening, cervical retraction exercises. Pn reduced at end of visit. Educated again on avoidance of prolonged poor postures. Encouraged rest breaks during time spent on computer/reading. Provided RTB for home use.    Plan  Plan details: Continue w/ postural strengthening, light L cervical stretching, traction, neurodynamics          Timed:         Manual Therapy:    10     mins  24217;     Therapeutic Exercise:    9     mins  91789;     Neuromuscular Alla:    8    mins  32129;    Therapeutic Activity:     10     mins  41828;     Gait Trainin     mins  55784;     Ultrasound:     0     mins  34680;    Ionto                               0    mins   19892  Self Care                       0     mins   05877  Canalith Repos    0     mins  42120      Un-Timed:  Electrical Stimulation:    0     mins  96938 ( );  Dry Needling     0     mins self-pay  Traction     0     mins 15206      Timed Treatment:   37   mins   Total Treatment:     37   mins    Radha Hale, PT  [place] License: #[id number]

## 2021-12-10 ENCOUNTER — TREATMENT (OUTPATIENT)
Dept: PHYSICAL THERAPY | Facility: CLINIC | Age: 51
End: 2021-12-10

## 2021-12-10 ENCOUNTER — TRANSCRIBE ORDERS (OUTPATIENT)
Dept: PHYSICAL THERAPY | Facility: CLINIC | Age: 51
End: 2021-12-10

## 2021-12-10 DIAGNOSIS — M54.12 RADICULOPATHY, CERVICAL: Primary | ICD-10-CM

## 2021-12-10 DIAGNOSIS — M51.36 DDD (DEGENERATIVE DISC DISEASE), LUMBAR: Primary | ICD-10-CM

## 2021-12-10 PROCEDURE — 97530 THERAPEUTIC ACTIVITIES: CPT | Performed by: PHYSICAL THERAPIST

## 2021-12-10 PROCEDURE — 97140 MANUAL THERAPY 1/> REGIONS: CPT | Performed by: PHYSICAL THERAPIST

## 2021-12-10 PROCEDURE — 97110 THERAPEUTIC EXERCISES: CPT | Performed by: PHYSICAL THERAPIST

## 2021-12-10 PROCEDURE — 97112 NEUROMUSCULAR REEDUCATION: CPT | Performed by: PHYSICAL THERAPIST

## 2021-12-10 NOTE — PATIENT INSTRUCTIONS
Access Code: [email or web address]  URL: [email or web address]  Date: 12/10/2021  Prepared by: Radha Hale    Exercises  Cervical Retraction at Wall - 1 x daily - 15-30 reps  Low Trap Setting at Wall - 1 x daily - 15-30 reps  Shoulder W - External Rotation with Resistance - 1 x daily - 15-30 reps  Median Nerve Flossing - Tray - 1 x daily - 15-30 reps

## 2021-12-10 NOTE — PROGRESS NOTES
Physical Therapy Daily Treatment Note/Reassessment      Patient: Dada Hooks   : 1970  Referring practitioner: Self Referring  Date of Initial Visit: Type: THERAPY  Noted: 2021  Today's Date: 12/10/2021  Patient seen for 6 sessions       Visit Diagnoses:    ICD-10-CM ICD-9-CM   1. Radiculopathy, cervical  M54.12 723.4       Subjective   Dada Hooks reports: Feels like he is continuing to see improvements. Feels very mild discomfort in the L shoulder blade right now. 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 Has not had any episodes of intense pn w/ laying in bed which has been a primary complaint. Tingling is diminished to the point he barely notices it. Still has some discomfort to move his head side to side.    Mod Osw 3/50=6% impairment    Pre tx pn score: 2  Post tx pn score: 1      Objective          Postural Observations  Seated posture: fair        Tenderness     Additional Tenderness Details  Mild TTP L lower cervical facets    Neurological Testing     Sensation   Cervical/Thoracic   Left   Intact: light touch    Right   Intact: light touch    Reflexes   Left   Biceps (C5/C6): trace (1+)  Brachioradialis (C6): trace (1+)    Right   Biceps (C5/C6): trace (1+)  Brachioradialis (C6): trace (1+)    Additional Neurological Details  Reports tingling w/ touch over digits 1,2, and radial half of 3rd digit    Active Range of Motion   Cervical/Thoracic Spine   Cervical    Flexion: 58 degrees   Extension: 62 degrees   Left lateral flexion: 45 degrees with pain  Right lateral flexion: 50 degrees   Left rotation: 80 degrees with pain  Right rotation: 80 degrees     Additional Active Range of Motion Details  L lower cervical/post shoulder pn w/ L rotation, SB      Strength/Myotome Testing     Left Wrist/Hand      (2nd hand position)   lbs: 112    Right Wrist/Hand      (2nd hand position)   lbs: 125    Tests   Cervical     Left   Negative  Spurling's sign, ULTT1, ULTT3 and ULTT4.       See Exercise, Manual, and Modality Logs for complete treatment.       Assessment & Plan     Assessment    Assessment details: Reassessment performed. Pt has completed 6 PT visits. Pt subjectively reports resolution of neck pn, near full resolution of UE paresthesia, and improved shoulder pn. He displays significantly improved cervical mobility in all planes, though cont to report mild to mod discomfort in shoulder blade w/ end range L rotation and SB, indicating ongoing facet joint dysfunction. He is responding very well to program of manual cervical distraction and mobilization, postural strengthening, and neurodynamics. He is making good progress toward PT goals, but cont to have pn w/ repetitive activities, head movement and would benefit from cont to PT to address remaining deficits.    Goals  Plan Goals: STG 3 wks  1) Pt to be compliant w/ initial HEP for ROM, strength and symptom mgmt.-MET  2) Pt to report pn at rest to be no greater than 2/10.-MET  3) Pt to improve cervical ROM to 45 deg L rotation, 30 deg L SB.-MET    LTG 6 wks  1) Pt to be independent w/ long term HEP and self mgmt.-PROGRESSING  2) Pt to report no pn at rest.-PROGRESSING (MET CERVICAL)  3) Pt to improve cervical ROM to 60 deg L rotation, 45 deg L SB w/o pn.-MET;GOAL REVISED  4) Pt to report resolution of UE paresthesia.-PROGRESSING  5) Pt to report no sleep disturbance related to his symptoms.-PROGRESSING      Plan  Plan details: Cont w/ current POC, focusing on cervical mobilization, stretching to restore pn free mobility as well as postural strengthening          Timed:         Manual Therapy:    10     mins  79153;     Therapeutic Exercise:    14     mins  40534;     Neuromuscular Alla:    8    mins  05820;    Therapeutic Activity:     10     mins  45556;     Gait Trainin     mins  20498;     Ultrasound:     0     mins  16707;    Ionto                               0    mins    07468  Self Care                       0     mins   68142  Canalith Repos    0     mins 24897      Un-Timed:  Electrical Stimulation:    0     mins  65065 ( );  Dry Needling     0     mins self-pay  Traction     0     mins 77360      Timed Treatment:   42   mins   Total Treatment:     42   mins    Radha Hale, PT  [place] License: #[id number]

## 2022-01-04 ENCOUNTER — LAB (OUTPATIENT)
Dept: LAB | Facility: HOSPITAL | Age: 52
End: 2022-01-04

## 2022-01-04 ENCOUNTER — OFFICE VISIT (OUTPATIENT)
Dept: INTERNAL MEDICINE | Facility: CLINIC | Age: 52
End: 2022-01-04

## 2022-01-04 VITALS
OXYGEN SATURATION: 97 % | HEART RATE: 50 BPM | SYSTOLIC BLOOD PRESSURE: 118 MMHG | HEIGHT: 76 IN | DIASTOLIC BLOOD PRESSURE: 72 MMHG | BODY MASS INDEX: 28.57 KG/M2 | RESPIRATION RATE: 20 BRPM | TEMPERATURE: 96.9 F | WEIGHT: 234.6 LBS

## 2022-01-04 DIAGNOSIS — Z00.00 ROUTINE GENERAL MEDICAL EXAMINATION AT A HEALTH CARE FACILITY: Primary | ICD-10-CM

## 2022-01-04 DIAGNOSIS — Z12.5 SCREENING PSA (PROSTATE SPECIFIC ANTIGEN): ICD-10-CM

## 2022-01-04 DIAGNOSIS — Z00.00 ROUTINE GENERAL MEDICAL EXAMINATION AT A HEALTH CARE FACILITY: ICD-10-CM

## 2022-01-04 DIAGNOSIS — E03.9 HYPOTHYROIDISM (ACQUIRED): ICD-10-CM

## 2022-01-04 DIAGNOSIS — E55.9 VITAMIN D DEFICIENCY: ICD-10-CM

## 2022-01-04 PROBLEM — J06.9 URI WITH COUGH AND CONGESTION: Status: RESOLVED | Noted: 2018-12-29 | Resolved: 2022-01-04

## 2022-01-04 LAB
ALBUMIN SERPL-MCNC: 4.9 G/DL (ref 3.5–5.2)
ALBUMIN/GLOB SERPL: 2.3 G/DL
ALP SERPL-CCNC: 86 U/L (ref 39–117)
ALT SERPL W P-5'-P-CCNC: 32 U/L (ref 1–41)
ANION GAP SERPL CALCULATED.3IONS-SCNC: 6.2 MMOL/L (ref 5–15)
AST SERPL-CCNC: 24 U/L (ref 1–40)
BASOPHILS # BLD AUTO: 0.03 10*3/MM3 (ref 0–0.2)
BASOPHILS NFR BLD AUTO: 0.5 % (ref 0–1.5)
BILIRUB SERPL-MCNC: 1.1 MG/DL (ref 0–1.2)
BUN SERPL-MCNC: 10 MG/DL (ref 6–20)
BUN/CREAT SERPL: 8.4 (ref 7–25)
CALCIUM SPEC-SCNC: 9.7 MG/DL (ref 8.6–10.5)
CHLORIDE SERPL-SCNC: 101 MMOL/L (ref 98–107)
CHOLEST SERPL-MCNC: 199 MG/DL (ref 0–200)
CO2 SERPL-SCNC: 31.8 MMOL/L (ref 22–29)
CREAT SERPL-MCNC: 1.19 MG/DL (ref 0.76–1.27)
DEPRECATED RDW RBC AUTO: 39.4 FL (ref 37–54)
EOSINOPHIL # BLD AUTO: 0.17 10*3/MM3 (ref 0–0.4)
EOSINOPHIL NFR BLD AUTO: 3.1 % (ref 0.3–6.2)
ERYTHROCYTE [DISTWIDTH] IN BLOOD BY AUTOMATED COUNT: 12.7 % (ref 12.3–15.4)
GFR SERPL CREATININE-BSD FRML MDRD: 64 ML/MIN/1.73
GLOBULIN UR ELPH-MCNC: 2.1 GM/DL
GLUCOSE SERPL-MCNC: 93 MG/DL (ref 65–99)
HBA1C MFR BLD: 5.26 % (ref 4.8–5.6)
HCT VFR BLD AUTO: 47 % (ref 37.5–51)
HCV AB SER DONR QL: NORMAL
HDLC SERPL-MCNC: 39 MG/DL (ref 40–60)
HGB BLD-MCNC: 15.8 G/DL (ref 13–17.7)
IMM GRANULOCYTES # BLD AUTO: 0.02 10*3/MM3 (ref 0–0.05)
IMM GRANULOCYTES NFR BLD AUTO: 0.4 % (ref 0–0.5)
LDLC SERPL CALC-MCNC: 134 MG/DL (ref 0–100)
LDLC/HDLC SERPL: 3.36 {RATIO}
LYMPHOCYTES # BLD AUTO: 1.08 10*3/MM3 (ref 0.7–3.1)
LYMPHOCYTES NFR BLD AUTO: 19.7 % (ref 19.6–45.3)
MCH RBC QN AUTO: 29.3 PG (ref 26.6–33)
MCHC RBC AUTO-ENTMCNC: 33.6 G/DL (ref 31.5–35.7)
MCV RBC AUTO: 87 FL (ref 79–97)
MONOCYTES # BLD AUTO: 0.58 10*3/MM3 (ref 0.1–0.9)
MONOCYTES NFR BLD AUTO: 10.6 % (ref 5–12)
NEUTROPHILS NFR BLD AUTO: 3.61 10*3/MM3 (ref 1.7–7)
NEUTROPHILS NFR BLD AUTO: 65.7 % (ref 42.7–76)
NRBC BLD AUTO-RTO: 0 /100 WBC (ref 0–0.2)
PLATELET # BLD AUTO: 177 10*3/MM3 (ref 140–450)
PMV BLD AUTO: 11.1 FL (ref 6–12)
POTASSIUM SERPL-SCNC: 4.2 MMOL/L (ref 3.5–5.2)
PROT SERPL-MCNC: 7 G/DL (ref 6–8.5)
PSA SERPL-MCNC: 0.7 NG/ML (ref 0–4)
RBC # BLD AUTO: 5.4 10*6/MM3 (ref 4.14–5.8)
SODIUM SERPL-SCNC: 139 MMOL/L (ref 136–145)
TRIGL SERPL-MCNC: 144 MG/DL (ref 0–150)
VLDLC SERPL-MCNC: 26 MG/DL (ref 5–40)
WBC NRBC COR # BLD: 5.49 10*3/MM3 (ref 3.4–10.8)

## 2022-01-04 PROCEDURE — 99396 PREV VISIT EST AGE 40-64: CPT | Performed by: PHYSICIAN ASSISTANT

## 2022-01-04 PROCEDURE — 36415 COLL VENOUS BLD VENIPUNCTURE: CPT

## 2022-01-04 PROCEDURE — 85025 COMPLETE CBC W/AUTO DIFF WBC: CPT | Performed by: PHYSICIAN ASSISTANT

## 2022-01-04 PROCEDURE — G0103 PSA SCREENING: HCPCS | Performed by: PHYSICIAN ASSISTANT

## 2022-01-04 PROCEDURE — 86803 HEPATITIS C AB TEST: CPT | Performed by: PHYSICIAN ASSISTANT

## 2022-01-04 PROCEDURE — 80061 LIPID PANEL: CPT | Performed by: PHYSICIAN ASSISTANT

## 2022-01-04 PROCEDURE — 80053 COMPREHEN METABOLIC PANEL: CPT | Performed by: PHYSICIAN ASSISTANT

## 2022-01-04 PROCEDURE — 83036 HEMOGLOBIN GLYCOSYLATED A1C: CPT | Performed by: PHYSICIAN ASSISTANT

## 2022-01-04 NOTE — PROGRESS NOTES
Chief Complaint  Annual Exam and Vaccination Question    Subjective          History of Present Illness  Dada Hooks presents to [facility] for a routine physical.  Hypothyroid:  Is followed by Endo for this. Dx in late 30s due to weight gain, lethargy, difficulty concentrating.  Doing well no w/o sx of thyroid problem that he knows of.    Bradycardia:  Recently saw cardiology for this, normal work up. No concerns or f/u needed.     Cervicalgia:  Was doing PT for this but sx are improved. Was having shooting pains down left arm with finger tingling.     Would like flu shot and shingles shot.  Has been vaccinated for Covid, has had hep A vaccines and Tdap in 2019    No hx smoking, COPD, asthma.     No hx specialist visits other than cardio and endo. Was hospitalized in the past for accidents but no illnesses. No hx of surgery other than toenail removal.     Fhx of DMII but he has never had problems with his sugar.   GGF lung CA. Not sure about other fhx of CA. No known fhx of prostate, breast, or colon CA.  Mom had AMI age 70s.       Diet/exercise: does not exercise much, will eat a lot of carbs/sugar.   Eye exams: wears glasses, sees eye dr yearly, last visit 1 mo ago  Dental exams: sees dentist in [place], [place] regularly    No LMP for male patient.   reports being sexually active and has had partner(s) who are female. He reports using the following methods of birth control/protection: Condom and I.U.D..  Cancer-related family history is not on file.  Colonoscopy- had 4-5 years ago, was for screening and was normal.   PSA- never      Health Maintenance   Topic Date Due   • COLORECTAL CANCER SCREENING  Never done   • ZOSTER VACCINE (1 of 2) Never done   • HEPATITIS C SCREENING  Never done   • INFLUENZA VACCINE  Never done   • ANNUAL PHYSICAL  01/05/2023   • TDAP/TD VACCINES (2 - Td or Tdap) 10/09/2029   • COVID-19 Vaccine  Completed   • Pneumococcal Vaccine 0-64  Aged Out        Immunization History   Administered Date(s) Administered   • COVID-19 (MODERNA) 1st, 2nd, 3rd Dose Only 03/10/2021, 04/07/2021, 11/05/2021   • Hepatitis A 10/09/2019   • Tdap 10/09/2019       Review of Systems   Constitutional: Negative for fatigue, fever and unexpected weight loss.   HENT: Negative for congestion, ear pain and sore throat.    Eyes: Negative for visual disturbance.   Respiratory: Negative for cough, shortness of breath and wheezing.    Cardiovascular: Negative for chest pain and palpitations.   Gastrointestinal: Negative for abdominal pain, blood in stool, constipation, diarrhea, nausea and indigestion.   Endocrine: Negative for polydipsia and polyuria.   Genitourinary: Negative for decreased urine volume, dysuria, frequency, hematuria, nocturia, scrotal swelling, testicular pain, urgency and urinary incontinence.   Musculoskeletal: Negative for arthralgias, back pain, joint swelling and myalgias.   Skin: Negative for rash.   Neurological: Negative for dizziness, syncope, headache and confusion.   Psychiatric/Behavioral: Negative for sleep disturbance and depressed mood. The patient is not nervous/anxious.        The following portions of the patient's history were reviewed and updated as appropriate: allergies, current medications, past family history, past medical history, past social history, past surgical history and problem list.    Patient Active Problem List   Diagnosis   • Hypothyroidism (acquired)   • Vitamin D deficiency   • Bradycardia   • Routine general medical examination at a health care facility     Allergies   Allergen Reactions   • Penicillins Anaphylaxis     Current Outpatient Medications on File Prior to Visit   Medication Sig Dispense Refill   • cetirizine (zyrTEC) 10 MG tablet Take 10 mg by mouth Daily.     • Cholecalciferol (Vitamin D3) 50 MCG (2000 UT) capsule Take 1 capsule by mouth Daily.     • levothyroxine (Synthroid) 75 MCG tablet Take 1 tablet by mouth Daily. 90  "tablet 3   • vitamin B-12 (CYANOCOBALAMIN) 1000 MCG tablet Take 1,000 mcg by mouth Daily.       No current facility-administered medications on file prior to visit.     No orders of the defined types were placed in this encounter.      Past Medical History:   Diagnosis Date   • Allergic    • Disease of thyroid gland    • Fatigue    • Gout    • Hypothyroidism    • Vitamin D deficiency       Past Surgical History:   Procedure Laterality Date   • TOENAIL EXCISION        Family History   Problem Relation Age of Onset   • Diabetes Mother    • Heart attack Mother    • Diabetes Maternal Grandmother    • Diabetes Maternal Grandfather    • No Known Problems Father       Social History     Socioeconomic History   • Marital status: Single   Tobacco Use   • Smoking status: Never Smoker   • Smokeless tobacco: Never Used   Vaping Use   • Vaping Use: Never used   Substance and Sexual Activity   • Alcohol use: Yes     Alcohol/week: 0.0 standard drinks     Comment: rare   • Drug use: No   • Sexual activity: Yes     Partners: Female     Birth control/protection: Condom, I.U.D.        Objective   Vital Signs:   Vitals:    01/04/22 0907   BP: 118/72   Pulse: 50   Resp: 20   Temp: 96.9 °F (36.1 °C)   TempSrc: Temporal   SpO2: 97%   Weight: 106 kg (234 lb 9.6 oz)   Height: 193 cm (76\")   PainSc: 0-No pain      Body mass index is 28.56 kg/m².  Patient's Body mass index is 28.56 kg/m². indicating that he is overweight (BMI 25-29.9). Obesity-related health conditions include the following: none. Obesity is unchanged. BMI is is above average; BMI management plan is completed. We discussed portion control and increasing exercise..    Physical Exam  Vitals reviewed.   Constitutional:       General: He is not in acute distress.     Appearance: Normal appearance. He is not ill-appearing.   HENT:      Head: Normocephalic and atraumatic.      Right Ear: Tympanic membrane, ear canal and external ear normal.      Left Ear: Tympanic membrane, ear " canal and external ear normal.      Mouth/Throat:      Mouth: Mucous membranes are moist.      Pharynx: No oropharyngeal exudate or posterior oropharyngeal erythema.   Eyes:      General: No scleral icterus.     Extraocular Movements: Extraocular movements intact.      Conjunctiva/sclera: Conjunctivae normal.      Pupils: Pupils are equal, round, and reactive to light.   Cardiovascular:      Rate and Rhythm: Normal rate and regular rhythm.      Pulses: Normal pulses.      Heart sounds: Normal heart sounds. No murmur heard.      Pulmonary:      Effort: Pulmonary effort is normal. No respiratory distress.      Breath sounds: Normal breath sounds. No stridor. No wheezing, rhonchi or rales.   Abdominal:      General: Bowel sounds are normal. There is no distension.      Palpations: Abdomen is soft. There is no mass.      Tenderness: There is no abdominal tenderness. There is no guarding.   Musculoskeletal:         General: No signs of injury. Normal range of motion.      Cervical back: Normal range of motion and neck supple.      Right lower leg: No edema.      Left lower leg: No edema.   Lymphadenopathy:      Cervical: No cervical adenopathy.   Skin:     General: Skin is warm and dry.      Coloration: Skin is not jaundiced.      Findings: No rash.   Neurological:      General: No focal deficit present.      Mental Status: He is alert and oriented to person, place, and time.      Gait: Gait normal.   Psychiatric:         Mood and Affect: Mood normal.         Behavior: Behavior normal.          Result Review :                   Assessment and Plan    Diagnoses and all orders for this visit:    1. Routine general medical examination at a health care facility (Primary)  Assessment & Plan:  Work on healthy diet, decrease sugars, exercise regularly.     Orders:  -     Hemoglobin A1c; Future  -     Comprehensive Metabolic Panel; Future  -     Lipid Panel; Future  -     CBC Auto Differential; Future  -     Hepatitis C  Antibody; Future    2. Hypothyroidism (acquired)  Assessment & Plan:  F/u with Endo as dir, Cont Synthroid 75mcg      3. Vitamin D deficiency  Assessment & Plan:  Cont vid D supplement.       4. Screening PSA (prostate specific antigen)  -     PSA SCREENING; Future    Pt will get us info on previous colonoscopy done 4-5 years ago.  Will get flu and shingles shot at pharmacy as we are out of flu shot.    Follow Up   Return in about 1 year (around 1/4/2023), or if symptoms worsen or fail to improve.    Counseled on health maintenance topics and preventative care recommendations. Follow up yearly for routine physical exam. Diet and exercise counseling given. See dentist and eye doctor yearly as directed.    If labs or images are ordered we will contact you with the results within the next week.  If you have not heard from us after a week please call our office to inquire about the results.    Celia Jiang PA-C    Patient was given instructions and counseling regarding his condition or for health maintenance advice. Please see specific information pulled into the AVS if appropriate.     * Please note that portions of this note were completed with a voice recognition program.

## 2022-01-05 ENCOUNTER — TELEPHONE (OUTPATIENT)
Dept: INTERNAL MEDICINE | Facility: CLINIC | Age: 52
End: 2022-01-05

## 2022-01-05 NOTE — TELEPHONE ENCOUNTER
S/W pt to inform him on lab results. Advised on which foods are good for him. He verbalized good understanding and appreciation.

## 2022-01-05 NOTE — TELEPHONE ENCOUNTER
----- Message from Celia Jiang PA-C sent at 1/5/2022 12:22 PM EST -----  Your labs overall looked good and within acceptable ranges except your bad cholesterol was a little elevated so work on a healthy diet high in fiber and low in saturated fats and carbs.

## 2022-01-07 ENCOUNTER — PATIENT ROUNDING (BHMG ONLY) (OUTPATIENT)
Dept: INTERNAL MEDICINE | Facility: CLINIC | Age: 52
End: 2022-01-07

## 2022-01-07 NOTE — PROGRESS NOTES
A [employer] message has been sent to the patient for patient rounding [place] [facility]

## 2022-02-14 DIAGNOSIS — H91.90 HEARING LOSS, UNSPECIFIED HEARING LOSS TYPE, UNSPECIFIED LATERALITY: Primary | ICD-10-CM

## 2022-03-31 ENCOUNTER — TELEPHONE (OUTPATIENT)
Dept: INTERNAL MEDICINE | Facility: CLINIC | Age: 52
End: 2022-03-31

## 2022-12-02 ENCOUNTER — LAB (OUTPATIENT)
Dept: LAB | Facility: HOSPITAL | Age: 52
End: 2022-12-02

## 2022-12-02 ENCOUNTER — OFFICE VISIT (OUTPATIENT)
Dept: ENDOCRINOLOGY | Facility: CLINIC | Age: 52
End: 2022-12-02

## 2022-12-02 VITALS
WEIGHT: 238 LBS | HEIGHT: 76 IN | SYSTOLIC BLOOD PRESSURE: 118 MMHG | DIASTOLIC BLOOD PRESSURE: 64 MMHG | OXYGEN SATURATION: 99 % | BODY MASS INDEX: 28.98 KG/M2 | HEART RATE: 53 BPM

## 2022-12-02 DIAGNOSIS — E03.9 HYPOTHYROIDISM (ACQUIRED): Primary | ICD-10-CM

## 2022-12-02 DIAGNOSIS — E55.9 VITAMIN D DEFICIENCY: ICD-10-CM

## 2022-12-02 DIAGNOSIS — E03.9 HYPOTHYROIDISM (ACQUIRED): ICD-10-CM

## 2022-12-02 LAB
25(OH)D3 SERPL-MCNC: 41.4 NG/ML (ref 30–100)
ALBUMIN SERPL-MCNC: 4.3 G/DL (ref 3.5–5.2)
ALBUMIN/GLOB SERPL: 2 G/DL
ALP SERPL-CCNC: 76 U/L (ref 39–117)
ALT SERPL W P-5'-P-CCNC: 20 U/L (ref 1–41)
ANION GAP SERPL CALCULATED.3IONS-SCNC: 9 MMOL/L (ref 5–15)
AST SERPL-CCNC: 19 U/L (ref 1–40)
BILIRUB SERPL-MCNC: 0.5 MG/DL (ref 0–1.2)
BUN SERPL-MCNC: 12 MG/DL (ref 6–20)
BUN/CREAT SERPL: 9.5 (ref 7–25)
CALCIUM SPEC-SCNC: 9.3 MG/DL (ref 8.6–10.5)
CHLORIDE SERPL-SCNC: 102 MMOL/L (ref 98–107)
CO2 SERPL-SCNC: 29 MMOL/L (ref 22–29)
CREAT SERPL-MCNC: 1.26 MG/DL (ref 0.76–1.27)
EGFRCR SERPLBLD CKD-EPI 2021: 68.6 ML/MIN/1.73
GLOBULIN UR ELPH-MCNC: 2.2 GM/DL
GLUCOSE SERPL-MCNC: 79 MG/DL (ref 65–99)
POTASSIUM SERPL-SCNC: 3.8 MMOL/L (ref 3.5–5.2)
PROT SERPL-MCNC: 6.5 G/DL (ref 6–8.5)
SODIUM SERPL-SCNC: 140 MMOL/L (ref 136–145)
TSH SERPL DL<=0.05 MIU/L-ACNC: 1.91 UIU/ML (ref 0.27–4.2)

## 2022-12-02 PROCEDURE — 84443 ASSAY THYROID STIM HORMONE: CPT

## 2022-12-02 PROCEDURE — 99213 OFFICE O/P EST LOW 20 MIN: CPT | Performed by: INTERNAL MEDICINE

## 2022-12-02 PROCEDURE — 82306 VITAMIN D 25 HYDROXY: CPT

## 2022-12-02 PROCEDURE — 80053 COMPREHEN METABOLIC PANEL: CPT

## 2022-12-02 RX ORDER — LEVOTHYROXINE SODIUM 0.07 MG/1
75 TABLET ORAL DAILY
Qty: 90 TABLET | Refills: 3 | Status: SHIPPED | OUTPATIENT
Start: 2022-12-02 | End: 2022-12-02 | Stop reason: SDUPTHER

## 2022-12-02 RX ORDER — LEVOTHYROXINE SODIUM 0.07 MG/1
75 TABLET ORAL DAILY
Qty: 90 TABLET | Refills: 3 | Status: SHIPPED | OUTPATIENT
Start: 2022-12-02

## 2022-12-02 NOTE — PROGRESS NOTES
"     Office Note      Date: 2022  Patient Name: Dada Hooks  MRN: [id number]  : 1970    Chief Complaint   Patient presents with   • Hypothyroidism       History of Present Illness:   Dada Hooks is a 52 y.o. male who presents for Hypothyroidism    He remains on synthroid 75mcg qd. He is taking this correctly. He isn't taking any interfering meds concurrently. He denies any sxs of hypo- or hyperthyroidism, aside from some fatigue. He hasn't noted any changes in his neck. He denies compressive sxs.     He is taking vitamin D 2000 IU qd.    Subjective      Review of Systems:   Review of Systems   Constitutional: Negative.    Cardiovascular: Negative.    Gastrointestinal: Negative.    Endocrine: Negative.        The following portions of the patient's history were reviewed and updated as appropriate: allergies, current medications, past family history, past medical history, past social history, past surgical history and problem list.    Objective     Visit Vitals  /64 (BP Location: Left arm, Patient Position: Sitting, Cuff Size: Adult)   Pulse 53   Ht 193 cm (76\")   Wt 108 kg (238 lb)   SpO2 99%   BMI 28.97 kg/m²       Physical Exam:  Physical Exam  Constitutional:       Appearance: Normal appearance.   Neck:      Thyroid: No thyroid mass, thyromegaly or thyroid tenderness.   Lymphadenopathy:      Cervical: No cervical adenopathy.   Neurological:      Mental Status: He is alert.         Labs:    TSH  No results found for: TSHBASE     Free T4  No results found for: FREET4    T3  No results found for: N2BUVXM      TPO  No results found for: THYROIDAB    TG AB  No results found for: THGAB    TG  No results found for: THYROGLB    CBC w/DIFF  Lab Results   Component Value Date    WBC 5.49 2022    RBC 5.40 2022    HGB 15.8 2022    HCT 47.0 2022    MCV 87.0 2022    MCH 29.3 2022    MCHC 33.6 2022    RDW 12.7 2022    RDWSD 39.4 2022    MPV 11.1 " 01/04/2022     01/04/2022    NEUTRORELPCT 65.7 01/04/2022    LYMPHORELPCT 19.7 01/04/2022    MONORELPCT 10.6 01/04/2022    EOSRELPCT 3.1 01/04/2022    BASORELPCT 0.5 01/04/2022    AUTOIGPER 0.4 01/04/2022    NEUTROABS 3.61 01/04/2022    LYMPHSABS 1.08 01/04/2022    MONOSABS 0.58 01/04/2022    EOSABS 0.17 01/04/2022    BASOSABS 0.03 01/04/2022    AUTOIGNUM 0.02 01/04/2022    NRBC 0.0 01/04/2022           Assessment / Plan      Assessment & Plan:  Diagnoses and all orders for this visit:    1. Hypothyroidism (acquired) (Primary)  Assessment & Plan:  Continue T4 tx.  Check TSH today.    Orders:  -     TSH; Future    2. Vitamin D deficiency  Assessment & Plan:  Continue vit D supplement.  Check vit D level today.    Orders:  -     Vitamin D,25-Hydroxy; Future  -     Comprehensive Metabolic Panel; Future    Other orders  -     Discontinue: levothyroxine (Synthroid) 75 MCG tablet; Take 1 tablet by mouth Daily.  Dispense: 90 tablet; Refill: 3  -     levothyroxine (Synthroid) 75 MCG tablet; Take 1 tablet by mouth Daily.  Dispense: 90 tablet; Refill: 3      Return in about 1 year (around 12/2/2023) for Recheck with TSH, vit D.    Watson Cárdenas MD   12/02/2022

## 2023-12-04 ENCOUNTER — OFFICE VISIT (OUTPATIENT)
Dept: ENDOCRINOLOGY | Facility: CLINIC | Age: 53
End: 2023-12-04
Payer: COMMERCIAL

## 2023-12-04 VITALS
SYSTOLIC BLOOD PRESSURE: 120 MMHG | DIASTOLIC BLOOD PRESSURE: 80 MMHG | HEIGHT: 76 IN | HEART RATE: 78 BPM | OXYGEN SATURATION: 98 % | WEIGHT: 243 LBS | BODY MASS INDEX: 29.59 KG/M2

## 2023-12-04 DIAGNOSIS — E03.9 HYPOTHYROIDISM (ACQUIRED): Primary | ICD-10-CM

## 2023-12-04 DIAGNOSIS — E55.9 VITAMIN D DEFICIENCY: ICD-10-CM

## 2023-12-04 LAB
25(OH)D3 SERPL-MCNC: 49.4 NG/ML (ref 30–100)
TSH SERPL DL<=0.05 MIU/L-ACNC: 3.57 UIU/ML (ref 0.27–4.2)

## 2023-12-04 PROCEDURE — 82306 VITAMIN D 25 HYDROXY: CPT | Performed by: INTERNAL MEDICINE

## 2023-12-04 PROCEDURE — 84443 ASSAY THYROID STIM HORMONE: CPT | Performed by: INTERNAL MEDICINE

## 2023-12-04 RX ORDER — LEVOTHYROXINE SODIUM 0.07 MG/1
75 TABLET ORAL DAILY
Qty: 90 TABLET | Refills: 3 | Status: SHIPPED | OUTPATIENT
Start: 2023-12-04

## 2023-12-04 NOTE — PROGRESS NOTES
"     Office Note      Date: 2023  Patient Name: Dada Hooks  MRN: [id number]  : 1970    Chief Complaint   Patient presents with    Hypothyroidism       History of Present Illness:   Dada Hooks is a 53 y.o. male who presents for Hypothyroidism    He remains on synthroid 75mcg qd. He is taking this correctly. He isn't taking any interfering meds concurrently. He denies any sxs of hypo- or hyperthyroidism, aside from some fatigue. He hasn't noted any changes in his neck. He denies compressive sxs.     He is taking vitamin D 2000 IU qd.    Subjective      Review of Systems:   Review of Systems   Constitutional:  Positive for fatigue.   Cardiovascular: Negative.    Gastrointestinal: Negative.    Endocrine: Negative.        The following portions of the patient's history were reviewed and updated as appropriate: allergies, current medications, past family history, past medical history, past social history, past surgical history, and problem list.    Objective     Visit Vitals  /80   Pulse 78   Ht 193 cm (76\")   Wt 110 kg (243 lb)   SpO2 98%   BMI 29.58 kg/m²       Physical Exam:  Physical Exam  Constitutional:       Appearance: Normal appearance.   Neck:      Thyroid: No thyroid mass, thyromegaly or thyroid tenderness.   Lymphadenopathy:      Cervical: No cervical adenopathy.   Neurological:      Mental Status: He is alert.         Labs:    TSH  No results found for: \"TSHBASE\"     Free T4  No results found for: \"FREET4\"    T3  No results found for: \"L4OYIUT\"      TPO  No results found for: \"THYROIDAB\"    TG AB  No results found for: \"THGAB\"    TG  No results found for: \"THYROGLB\"    CBC w/DIFF  Lab Results   Component Value Date    WBC 5.49 2022    RBC 5.40 2022    HGB 15.8 2022    HCT 47.0 2022    MCV 87.0 2022    MCH 29.3 2022    MCHC 33.6 2022    RDW 12.7 2022    RDWSD 39.4 2022    MPV 11.1 2022     2022    NEUTRORELPCT " 65.7 01/04/2022    LYMPHORELPCT 19.7 01/04/2022    MONORELPCT 10.6 01/04/2022    EOSRELPCT 3.1 01/04/2022    BASORELPCT 0.5 01/04/2022    AUTOIGPER 0.4 01/04/2022    NEUTROABS 3.61 01/04/2022    LYMPHSABS 1.08 01/04/2022    MONOSABS 0.58 01/04/2022    EOSABS 0.17 01/04/2022    BASOSABS 0.03 01/04/2022    AUTOIGNUM 0.02 01/04/2022    NRBC 0.0 01/04/2022           Assessment / Plan      Assessment & Plan:  Diagnoses and all orders for this visit:    1. Hypothyroidism (acquired) (Primary)  Assessment & Plan:  Continue Synthroid tx.  Check TSH today.    Orders:  -     TSH; Future    2. Vitamin D deficiency  Assessment & Plan:  Continue vit D supplement.  Check vit D level today.    Orders:  -     Vitamin D,25-Hydroxy; Future    Other orders  -     levothyroxine (Synthroid) 75 MCG tablet; Take 1 tablet by mouth Daily.  Dispense: 90 tablet; Refill: 3      Current Outpatient Medications   Medication Instructions    cetirizine (ZYRTEC) 10 mg, Oral, Daily    levothyroxine (SYNTHROID) 75 mcg, Oral, Daily    vitamin B-12 (CYANOCOBALAMIN) 1,000 mcg, Oral, Daily    Vitamin D3 2,000 Units, Oral, Daily      Return in about 1 year (around 12/4/2024) for Recheck with TSH, vit D.    Watson Cárdenas MD   12/04/2023

## 2024-09-09 ENCOUNTER — OFFICE VISIT (OUTPATIENT)
Dept: INTERNAL MEDICINE | Facility: CLINIC | Age: 54
End: 2024-09-09
Payer: COMMERCIAL

## 2024-09-09 ENCOUNTER — LAB (OUTPATIENT)
Dept: INTERNAL MEDICINE | Facility: CLINIC | Age: 54
End: 2024-09-09
Payer: COMMERCIAL

## 2024-09-09 VITALS
BODY MASS INDEX: 28.98 KG/M2 | OXYGEN SATURATION: 98 % | DIASTOLIC BLOOD PRESSURE: 80 MMHG | RESPIRATION RATE: 20 BRPM | TEMPERATURE: 97.8 F | HEART RATE: 51 BPM | HEIGHT: 76 IN | SYSTOLIC BLOOD PRESSURE: 138 MMHG | WEIGHT: 238 LBS

## 2024-09-09 DIAGNOSIS — Z12.5 SCREENING PSA (PROSTATE SPECIFIC ANTIGEN): ICD-10-CM

## 2024-09-09 DIAGNOSIS — G54.2 CERVICAL NEUROPATHY: ICD-10-CM

## 2024-09-09 DIAGNOSIS — M50.323 OTHER CERVICAL DISC DEGENERATION AT C6-C7 LEVEL: ICD-10-CM

## 2024-09-09 DIAGNOSIS — Z13.0 SCREENING FOR DEFICIENCY ANEMIA: ICD-10-CM

## 2024-09-09 DIAGNOSIS — Z00.00 ROUTINE GENERAL MEDICAL EXAMINATION AT A HEALTH CARE FACILITY: Primary | ICD-10-CM

## 2024-09-09 DIAGNOSIS — E03.9 HYPOTHYROIDISM (ACQUIRED): ICD-10-CM

## 2024-09-09 DIAGNOSIS — B35.1 ONYCHOMYCOSIS: ICD-10-CM

## 2024-09-09 DIAGNOSIS — Z13.220 SCREENING, LIPID: ICD-10-CM

## 2024-09-09 DIAGNOSIS — Z13.1 SCREENING FOR DIABETES MELLITUS: ICD-10-CM

## 2024-09-09 DIAGNOSIS — E66.3 OVERWEIGHT (BMI 25.0-29.9): ICD-10-CM

## 2024-09-09 DIAGNOSIS — Z83.3 FAMILY HISTORY OF DIABETES MELLITUS (DM): ICD-10-CM

## 2024-09-09 DIAGNOSIS — Z00.00 ROUTINE GENERAL MEDICAL EXAMINATION AT A HEALTH CARE FACILITY: ICD-10-CM

## 2024-09-09 DIAGNOSIS — E55.9 VITAMIN D DEFICIENCY: ICD-10-CM

## 2024-09-09 LAB
ALBUMIN SERPL-MCNC: 4.7 G/DL (ref 3.5–5.2)
ALBUMIN/GLOB SERPL: 2 G/DL
ALP SERPL-CCNC: 90 U/L (ref 39–117)
ALT SERPL W P-5'-P-CCNC: 27 U/L (ref 1–41)
ANION GAP SERPL CALCULATED.3IONS-SCNC: 10 MMOL/L (ref 5–15)
AST SERPL-CCNC: 28 U/L (ref 1–40)
BASOPHILS # BLD AUTO: 0.05 10*3/MM3 (ref 0–0.2)
BASOPHILS NFR BLD AUTO: 0.9 % (ref 0–1.5)
BILIRUB SERPL-MCNC: 0.5 MG/DL (ref 0–1.2)
BUN SERPL-MCNC: 11 MG/DL (ref 6–20)
BUN/CREAT SERPL: 8.3 (ref 7–25)
CALCIUM SPEC-SCNC: 9.9 MG/DL (ref 8.6–10.5)
CHLORIDE SERPL-SCNC: 101 MMOL/L (ref 98–107)
CHOLEST SERPL-MCNC: 161 MG/DL (ref 0–200)
CO2 SERPL-SCNC: 29 MMOL/L (ref 22–29)
CREAT SERPL-MCNC: 1.32 MG/DL (ref 0.76–1.27)
DEPRECATED RDW RBC AUTO: 41.9 FL (ref 37–54)
EGFRCR SERPLBLD CKD-EPI 2021: 64.1 ML/MIN/1.73
EOSINOPHIL # BLD AUTO: 0.16 10*3/MM3 (ref 0–0.4)
EOSINOPHIL NFR BLD AUTO: 2.8 % (ref 0.3–6.2)
ERYTHROCYTE [DISTWIDTH] IN BLOOD BY AUTOMATED COUNT: 12.8 % (ref 12.3–15.4)
EXPIRATION DATE: NORMAL
GLOBULIN UR ELPH-MCNC: 2.4 GM/DL
GLUCOSE SERPL-MCNC: 82 MG/DL (ref 65–99)
HBA1C MFR BLD: 5.5 % (ref 4.5–5.7)
HCT VFR BLD AUTO: 46.6 % (ref 37.5–51)
HDLC SERPL-MCNC: 37 MG/DL (ref 40–60)
HGB BLD-MCNC: 15.6 G/DL (ref 13–17.7)
IMM GRANULOCYTES # BLD AUTO: 0.04 10*3/MM3 (ref 0–0.05)
IMM GRANULOCYTES NFR BLD AUTO: 0.7 % (ref 0–0.5)
LDLC SERPL CALC-MCNC: 86 MG/DL (ref 0–100)
LDLC/HDLC SERPL: 2.14 {RATIO}
LYMPHOCYTES # BLD AUTO: 1.2 10*3/MM3 (ref 0.7–3.1)
LYMPHOCYTES NFR BLD AUTO: 20.9 % (ref 19.6–45.3)
Lab: NORMAL
MCH RBC QN AUTO: 30.1 PG (ref 26.6–33)
MCHC RBC AUTO-ENTMCNC: 33.5 G/DL (ref 31.5–35.7)
MCV RBC AUTO: 90 FL (ref 79–97)
MONOCYTES # BLD AUTO: 0.52 10*3/MM3 (ref 0.1–0.9)
MONOCYTES NFR BLD AUTO: 9 % (ref 5–12)
NEUTROPHILS NFR BLD AUTO: 3.78 10*3/MM3 (ref 1.7–7)
NEUTROPHILS NFR BLD AUTO: 65.7 % (ref 42.7–76)
NRBC BLD AUTO-RTO: 0 /100 WBC (ref 0–0.2)
PLATELET # BLD AUTO: 193 10*3/MM3 (ref 140–450)
PMV BLD AUTO: 11.6 FL (ref 6–12)
POTASSIUM SERPL-SCNC: 4.4 MMOL/L (ref 3.5–5.2)
PROT SERPL-MCNC: 7.1 G/DL (ref 6–8.5)
PSA SERPL-MCNC: 0.8 NG/ML (ref 0–4)
RBC # BLD AUTO: 5.18 10*6/MM3 (ref 4.14–5.8)
SODIUM SERPL-SCNC: 140 MMOL/L (ref 136–145)
TRIGL SERPL-MCNC: 224 MG/DL (ref 0–150)
TSH SERPL DL<=0.05 MIU/L-ACNC: 2.6 UIU/ML (ref 0.27–4.2)
VLDLC SERPL-MCNC: 38 MG/DL (ref 5–40)
WBC NRBC COR # BLD AUTO: 5.75 10*3/MM3 (ref 3.4–10.8)

## 2024-09-09 PROCEDURE — 36415 COLL VENOUS BLD VENIPUNCTURE: CPT | Performed by: PHYSICIAN ASSISTANT

## 2024-09-09 PROCEDURE — G0103 PSA SCREENING: HCPCS | Performed by: PHYSICIAN ASSISTANT

## 2024-09-09 PROCEDURE — 82746 ASSAY OF FOLIC ACID SERUM: CPT | Performed by: PHYSICIAN ASSISTANT

## 2024-09-09 PROCEDURE — 82607 VITAMIN B-12: CPT | Performed by: PHYSICIAN ASSISTANT

## 2024-09-09 PROCEDURE — 80050 GENERAL HEALTH PANEL: CPT | Performed by: PHYSICIAN ASSISTANT

## 2024-09-09 PROCEDURE — 80061 LIPID PANEL: CPT | Performed by: PHYSICIAN ASSISTANT

## 2024-09-09 PROCEDURE — 82306 VITAMIN D 25 HYDROXY: CPT | Performed by: PHYSICIAN ASSISTANT

## 2024-09-09 RX ORDER — CYCLOBENZAPRINE HCL 5 MG
5-10 TABLET ORAL 2 TIMES DAILY PRN
Qty: 30 TABLET | Refills: 1 | Status: SHIPPED | OUTPATIENT
Start: 2024-09-09

## 2024-09-10 LAB
25(OH)D3 SERPL-MCNC: 48.6 NG/ML (ref 30–100)
FOLATE SERPL-MCNC: 5.96 NG/ML (ref 4.78–24.2)
VIT B12 BLD-MCNC: 872 PG/ML (ref 211–946)

## 2024-11-25 NOTE — TELEPHONE ENCOUNTER
Rx Refill Note  Requested Prescriptions     Pending Prescriptions Disp Refills    levothyroxine (Synthroid) 75 MCG tablet [Pharmacy Med Name: SYNTHROID 75 MCG TABLET] 60 tablet 0     Sig: TAKE 1 TABLET BY MOUTH DAILY      Last office visit with prescribing clinician: 12/4/2023   Last telemedicine visit with prescribing clinician: Visit date not found   Next office visit with prescribing clinician: 12/4/2024                         Would you like a call back once the refill request has been completed: [] Yes [] No    If the office needs to give you a call back, can they leave a voicemail: [] Yes [] No    Alysha Buchanan MA  11/25/24, 11:32 EST

## 2024-11-26 RX ORDER — LEVOTHYROXINE SODIUM 75 MCG
75 TABLET ORAL DAILY
Qty: 90 TABLET | Refills: 3 | Status: SHIPPED | OUTPATIENT
Start: 2024-11-26

## 2024-12-04 ENCOUNTER — OFFICE VISIT (OUTPATIENT)
Dept: ENDOCRINOLOGY | Facility: CLINIC | Age: 54
End: 2024-12-04
Payer: COMMERCIAL

## 2024-12-04 VITALS
DIASTOLIC BLOOD PRESSURE: 74 MMHG | HEART RATE: 44 BPM | BODY MASS INDEX: 28.74 KG/M2 | OXYGEN SATURATION: 100 % | WEIGHT: 236 LBS | HEIGHT: 76 IN | SYSTOLIC BLOOD PRESSURE: 120 MMHG

## 2024-12-04 DIAGNOSIS — E55.9 VITAMIN D DEFICIENCY: ICD-10-CM

## 2024-12-04 DIAGNOSIS — E03.9 HYPOTHYROIDISM (ACQUIRED): Primary | ICD-10-CM

## 2024-12-04 PROCEDURE — 99213 OFFICE O/P EST LOW 20 MIN: CPT | Performed by: INTERNAL MEDICINE

## 2024-12-04 RX ORDER — LEVOTHYROXINE SODIUM 75 MCG
75 TABLET ORAL DAILY
Qty: 90 TABLET | Refills: 3 | Status: SHIPPED | OUTPATIENT
Start: 2024-12-04

## 2024-12-04 NOTE — PROGRESS NOTES
"     Office Note      Date: 2024  Patient Name: Dada Hooks  MRN: [id number]  : 1970    Chief Complaint   Patient presents with    Hypothyroidism       History of Present Illness:   Dada Hooks is a 54 y.o. male who presents for Hypothyroidism    He remains on synthroid 75mcg qd. He is taking this correctly. He isn't taking any interfering meds concurrently. He denies any sxs of hypo- or hyperthyroidism, aside from some fatigue. He notes some bradycardia.  He hasn't noted any changes in his neck. He denies compressive sxs.     He is taking vitamin D 2000 IU qd.    Subjective      Review of Systems:   Review of Systems   Constitutional:  Positive for fatigue.   Cardiovascular: Negative.    Gastrointestinal: Negative.    Endocrine: Negative.        The following portions of the patient's history were reviewed and updated as appropriate: allergies, current medications, past family history, past medical history, past social history, past surgical history, and problem list.    Objective     Visit Vitals  /74 (BP Location: Left arm, Patient Position: Sitting, Cuff Size: Adult)   Pulse (!) 44   Ht 193 cm (76\")   Wt 107 kg (236 lb)   SpO2 100%   BMI 28.73 kg/m²       Physical Exam:  Physical Exam  Constitutional:       Appearance: Normal appearance.   Neck:      Thyroid: No thyroid mass, thyromegaly or thyroid tenderness.   Lymphadenopathy:      Cervical: No cervical adenopathy.   Neurological:      Mental Status: He is alert.         Labs:    TSH  No results found for: \"TSHBASE\"     Free T4  No results found for: \"FREET4\"    T3  No results found for: \"V2WTZSW\"      TPO  No results found for: \"THYROIDAB\"    TG AB  No results found for: \"THGAB\"    TG  No results found for: \"THYROGLB\"    CBC w/DIFF  Lab Results   Component Value Date    WBC 5.75 2024    RBC 5.18 2024    HGB 15.6 2024    HCT 46.6 2024    MCV 90.0 2024    MCH 30.1 2024    MCHC 33.5 2024    RDW " 12.8 09/09/2024    RDWSD 41.9 09/09/2024    MPV 11.6 09/09/2024     09/09/2024    NEUTRORELPCT 65.7 09/09/2024    LYMPHORELPCT 20.9 09/09/2024    MONORELPCT 9.0 09/09/2024    EOSRELPCT 2.8 09/09/2024    BASORELPCT 0.9 09/09/2024    AUTOIGPER 0.7 (H) 09/09/2024    NEUTROABS 3.78 09/09/2024    LYMPHSABS 1.20 09/09/2024    MONOSABS 0.52 09/09/2024    EOSABS 0.16 09/09/2024    BASOSABS 0.05 09/09/2024    AUTOIGNUM 0.04 09/09/2024    NRBC 0.0 09/09/2024           Assessment / Plan      Assessment & Plan:  Diagnoses and all orders for this visit:    1. Hypothyroidism (acquired) (Primary)  Assessment & Plan:  Continue current Synthroid dose.  Recent TSH at goal at 2.6.   We discussed using Synthroid Delivers for cost savings.  He was interested in trying this.      2. Vitamin D deficiency  Assessment & Plan:  Continue vit D supplement.  Recent vit D was normal at 48.      Other orders  -     Synthroid 75 MCG tablet; Take 1 tablet by mouth Daily.  Dispense: 90 tablet; Refill: 3      Current Outpatient Medications   Medication Instructions    cetirizine (ZYRTEC) 10 mg, Daily    Synthroid 75 mcg, Oral, Daily    vitamin B-12 (CYANOCOBALAMIN) 1,000 mcg, Daily    Vitamin D3 2,000 Units, Daily      Return in about 6 months (around 6/4/2025) for Recheck with TSH.    Electronically signed by: Watson Cárdenas MD  12/04/2024

## 2024-12-04 NOTE — ASSESSMENT & PLAN NOTE
Continue current Synthroid dose.  Recent TSH at goal at 2.6.   We discussed using Synthroid Delivers for cost savings.  He was interested in trying this.